# Patient Record
Sex: FEMALE | Race: WHITE | NOT HISPANIC OR LATINO
[De-identification: names, ages, dates, MRNs, and addresses within clinical notes are randomized per-mention and may not be internally consistent; named-entity substitution may affect disease eponyms.]

---

## 2020-01-23 PROBLEM — Z00.00 ENCOUNTER FOR PREVENTIVE HEALTH EXAMINATION: Status: ACTIVE | Noted: 2020-01-23

## 2020-03-10 ENCOUNTER — APPOINTMENT (OUTPATIENT)
Dept: OBGYN | Facility: CLINIC | Age: 30
End: 2020-03-10

## 2021-07-16 ENCOUNTER — APPOINTMENT (OUTPATIENT)
Dept: OTOLARYNGOLOGY | Facility: CLINIC | Age: 31
End: 2021-07-16
Payer: COMMERCIAL

## 2021-07-16 VITALS
BODY MASS INDEX: 31.47 KG/M2 | SYSTOLIC BLOOD PRESSURE: 114 MMHG | WEIGHT: 171 LBS | DIASTOLIC BLOOD PRESSURE: 77 MMHG | HEIGHT: 62 IN | HEART RATE: 71 BPM

## 2021-07-16 DIAGNOSIS — Z3A.16 16 WEEKS GESTATION OF PREGNANCY: ICD-10-CM

## 2021-07-16 DIAGNOSIS — Z78.9 OTHER SPECIFIED HEALTH STATUS: ICD-10-CM

## 2021-07-16 DIAGNOSIS — H92.01 OTALGIA, RIGHT EAR: ICD-10-CM

## 2021-07-16 PROCEDURE — 92567 TYMPANOMETRY: CPT

## 2021-07-16 PROCEDURE — 99213 OFFICE O/P EST LOW 20 MIN: CPT | Mod: 25

## 2021-07-16 PROCEDURE — 92511 NASOPHARYNGOSCOPY: CPT

## 2021-07-16 NOTE — REVIEW OF SYSTEMS
[Ear Pain] : ear pain [Ear Itch] : ear itch [Sinus Pain] : sinus pain [Sinus Pressure] : sinus pressure [Heartburn] : heartburn [Negative] : Heme/Lymph

## 2021-07-16 NOTE — PHYSICAL EXAM
[Hearing Fox Test (Tuning Fork On Forehead)] : no lateralization of tone [Normal] : mucosa is normal [FreeTextEntry6] : minimal cerumen removed via curettage  [FreeTextEntry1] : Cyst on right tonsil. Superior pull.

## 2021-07-16 NOTE — PROCEDURE
[FreeTextEntry1] : Flexible Nasopharyngoscopy  [FreeTextEntry3] : Procedure:  Flexible Nasopharyngoscopy: Risks, benefits, and alternatives of flexible endoscopy were explained to the patient. The patient gave oral consent to proceed. The flexible scope was inserted into the right nasal cavity.  Endoscopy of the inferior and middle meatus was performed.  No polyp, mass, or lesion was appreciated.  Olfactory cleft was clear. Spheno-ethmoid recess is clear. Nasopharynx was clear.  The patient tolerated the procedure well.

## 2021-07-16 NOTE — DATA REVIEWED
[de-identified] : c/o\par \par - type A tymps AU\par - ETF WNL AD; abnormal AS\par \par rec: 1) ENT F/U

## 2021-07-16 NOTE — HISTORY OF PRESENT ILLNESS
[de-identified] : The patient presents with h/o chronic bilateral OE. The patient presents with right ear pain while lying down in the middle of the night., and notes tenderness along her ear to palpation. Her ear pain has been occurring every night.The pt further notes getting ear pain when lying on either side at night. Of note, the patient is currently 16.5 weeks along her pregnancy, and is taking prenatal vitamins. The pt adds having active reflux since pregnancy.

## 2021-07-16 NOTE — ASSESSMENT
[FreeTextEntry1] : Reviewed and reconciled medications, allergies, PMHx, PSHx, SocHx, FMHx.\par \par h/o chronic bilateral OE\par Right tonsil cyst with superior pull\par minimal deviation of the septum\par \par Audio: Type A tymps AU. ETF WNL AD, Abnromal AS. TPP AD -28, AS -29. \par \par \par Plan:\par Flexible Nasopharyngoscopy. Audio - results interpreted by Dr. Pal and reviewed with the patient. Recommended Afrin before bed for 1-2 nights if approved by her PCP/ OBGYN. Consider Flonase if Afrin improves ear pain. FU after delivery.

## 2021-07-16 NOTE — REASON FOR VISIT
[Subsequent Evaluation] : a subsequent evaluation for [FreeTextEntry2] : Patient here to check on ear pain in RT ear

## 2021-07-16 NOTE — ADDENDUM
[FreeTextEntry1] : Documented by Alen Arellano acting as scribe for Dr. Pal on 07/16/2021.\par \par All Medical record entries made by the Scribe were at my, Dr. Pal, direction and personally dictated by me on 07/16/2021 . I have reviewed the chart and agree that the record accurately reflects my personal performance of the history, physical exam, assessment and plan. I have also personally directed, reviewed, and agreed with the discharge instructions.

## 2021-07-26 ENCOUNTER — TRANSCRIPTION ENCOUNTER (OUTPATIENT)
Age: 31
End: 2021-07-26

## 2023-05-08 ENCOUNTER — APPOINTMENT (OUTPATIENT)
Dept: OTOLARYNGOLOGY | Facility: CLINIC | Age: 33
End: 2023-05-08
Payer: COMMERCIAL

## 2023-05-08 VITALS
SYSTOLIC BLOOD PRESSURE: 132 MMHG | DIASTOLIC BLOOD PRESSURE: 83 MMHG | BODY MASS INDEX: 31.28 KG/M2 | HEIGHT: 62 IN | WEIGHT: 170 LBS | HEART RATE: 71 BPM

## 2023-05-08 DIAGNOSIS — J31.1 CHRONIC NASOPHARYNGITIS: ICD-10-CM

## 2023-05-08 DIAGNOSIS — H90.11 CONDUCTIVE HEARING LOSS, UNILATERAL, RIGHT EAR, WITH UNRESTRICTED HEARING ON THE CONTRALATERAL SIDE: ICD-10-CM

## 2023-05-08 PROCEDURE — 92511 NASOPHARYNGOSCOPY: CPT

## 2023-05-08 PROCEDURE — 92557 COMPREHENSIVE HEARING TEST: CPT

## 2023-05-08 PROCEDURE — 99214 OFFICE O/P EST MOD 30 MIN: CPT | Mod: 25

## 2023-05-08 PROCEDURE — 92567 TYMPANOMETRY: CPT

## 2023-05-08 RX ORDER — ONDANSETRON 4 MG/1
4 TABLET, ORALLY DISINTEGRATING ORAL
Qty: 9 | Refills: 0 | Status: DISCONTINUED | COMMUNITY
Start: 2023-03-11

## 2023-05-08 RX ORDER — PNV NO.95/FERROUS FUM/FOLIC AC 28MG-0.8MG
TABLET ORAL
Refills: 0 | Status: DISCONTINUED | COMMUNITY
End: 2023-05-08

## 2023-05-08 RX ORDER — NORETHINDRONE ACETATE AND ETHINYL ESTRADIOL AND FERROUS FUMARATE 1MG-20(24)
1-20 KIT ORAL
Qty: 84 | Refills: 0 | Status: DISCONTINUED | COMMUNITY
Start: 2022-11-08

## 2023-05-08 RX ORDER — HYOSCYAMINE SULFATE 0.12 MG/1
0.12 TABLET ORAL
Qty: 20 | Refills: 0 | Status: DISCONTINUED | COMMUNITY
Start: 2023-03-11

## 2023-05-08 RX ORDER — AMOXICILLIN 875 MG/1
875 TABLET, FILM COATED ORAL
Qty: 14 | Refills: 0 | Status: DISCONTINUED | COMMUNITY
Start: 2023-01-15

## 2023-05-08 RX ORDER — AMOXICILLIN 500 MG/1
500 CAPSULE ORAL
Qty: 14 | Refills: 0 | Status: DISCONTINUED | COMMUNITY
Start: 2023-04-30

## 2023-05-08 RX ORDER — CIPROFLOXACIN AND DEXAMETHASONE 3; 1 MG/ML; MG/ML
0.3-0.1 SUSPENSION/ DROPS AURICULAR (OTIC)
Qty: 7 | Refills: 0 | Status: DISCONTINUED | COMMUNITY
Start: 2023-04-30

## 2023-05-08 NOTE — PHYSICAL EXAM
[Normal] : mucosa is normal [Midline] : trachea located in midline position [de-identified] : scarred AS, AD TM thickend w serous effusion

## 2023-05-08 NOTE — ASSESSMENT
[FreeTextEntry1] :   AD WNL to slight/mild CHL w type B, AS WNL w type B\par \par flonase daily\par discussed medrol dosepak and M&T, will hold off pending response to flonase\par f/u 2-3 weeks

## 2023-05-08 NOTE — DATA REVIEWED
[de-identified] : Type B tymps AU\par AD: normal to slight/mild CHL, 250-8000 Hz\par AS: Hrg is WNL, 250-8000 Hz\par REC: ENT f/u, re-eval per MD

## 2023-05-15 ENCOUNTER — TRANSCRIPTION ENCOUNTER (OUTPATIENT)
Age: 33
End: 2023-05-15

## 2023-06-06 ENCOUNTER — TRANSCRIPTION ENCOUNTER (OUTPATIENT)
Age: 33
End: 2023-06-06

## 2023-06-15 ENCOUNTER — TRANSCRIPTION ENCOUNTER (OUTPATIENT)
Age: 33
End: 2023-06-15

## 2023-07-17 ENCOUNTER — APPOINTMENT (OUTPATIENT)
Dept: OTOLARYNGOLOGY | Facility: CLINIC | Age: 33
End: 2023-07-17
Payer: COMMERCIAL

## 2023-07-17 VITALS
SYSTOLIC BLOOD PRESSURE: 124 MMHG | HEIGHT: 62 IN | HEART RATE: 63 BPM | BODY MASS INDEX: 30.36 KG/M2 | DIASTOLIC BLOOD PRESSURE: 80 MMHG | WEIGHT: 165 LBS

## 2023-07-17 DIAGNOSIS — H69.83 OTHER SPECIFIED DISORDERS OF EUSTACHIAN TUBE, BILATERAL: ICD-10-CM

## 2023-07-17 DIAGNOSIS — H65.21 CHRONIC SEROUS OTITIS MEDIA, RIGHT EAR: ICD-10-CM

## 2023-07-17 PROCEDURE — 99213 OFFICE O/P EST LOW 20 MIN: CPT

## 2023-07-17 PROCEDURE — 92557 COMPREHENSIVE HEARING TEST: CPT

## 2023-07-17 PROCEDURE — 92567 TYMPANOMETRY: CPT

## 2023-07-17 RX ORDER — FLUTICASONE PROPIONATE 50 MCG
SPRAY, SUSPENSION NASAL
Refills: 0 | Status: ACTIVE | COMMUNITY

## 2023-07-17 NOTE — PHYSICAL EXAM
[Normal] : mucosa is normal [Midline] : trachea located in midline position [de-identified] : scarred AU

## 2023-07-17 NOTE — HISTORY OF PRESENT ILLNESS
[de-identified] : 32 yr old female w serous OME AD tx w flonase, never took the medrol dosepak\par feels like her hearing is back to normal\par \par +childhood otitis w perf, s/p BMT x4\par \par flying 7/25/2023

## 2024-04-30 ENCOUNTER — APPOINTMENT (OUTPATIENT)
Dept: OTOLARYNGOLOGY | Facility: CLINIC | Age: 34
End: 2024-04-30
Payer: COMMERCIAL

## 2024-04-30 VITALS
HEIGHT: 62 IN | BODY MASS INDEX: 27.23 KG/M2 | HEART RATE: 80 BPM | WEIGHT: 148 LBS | SYSTOLIC BLOOD PRESSURE: 114 MMHG | DIASTOLIC BLOOD PRESSURE: 77 MMHG

## 2024-04-30 DIAGNOSIS — J31.0 CHRONIC RHINITIS: ICD-10-CM

## 2024-04-30 DIAGNOSIS — J34.2 DEVIATED NASAL SEPTUM: ICD-10-CM

## 2024-04-30 DIAGNOSIS — J35.8 OTHER CHRONIC DISEASES OF TONSILS AND ADENOIDS: ICD-10-CM

## 2024-04-30 DIAGNOSIS — Z78.9 OTHER SPECIFIED HEALTH STATUS: ICD-10-CM

## 2024-04-30 PROCEDURE — 99213 OFFICE O/P EST LOW 20 MIN: CPT

## 2024-04-30 PROCEDURE — 99203 OFFICE O/P NEW LOW 30 MIN: CPT

## 2024-04-30 RX ORDER — LORATADINE 5 MG/5 ML
SOLUTION, ORAL ORAL
Refills: 0 | Status: ACTIVE | COMMUNITY

## 2024-04-30 NOTE — PHYSICAL EXAM
[] : septum deviated to the left [de-identified] : ATROPHIC TURBINATES MOSTLY IN THE RIGHT [Normal] : mucosa is normal [Midline] : trachea located in midline position [de-identified] : RIGHT TONSIL SMALL CYST

## 2024-04-30 NOTE — REASON FOR VISIT
[Subsequent Evaluation] : a subsequent evaluation for [FreeTextEntry2] : Ear wax removal, allergy medication

## 2024-06-17 ENCOUNTER — APPOINTMENT (OUTPATIENT)
Dept: OTOLARYNGOLOGY | Facility: CLINIC | Age: 34
End: 2024-06-17

## 2024-06-20 ENCOUNTER — APPOINTMENT (OUTPATIENT)
Dept: OTOLARYNGOLOGY | Facility: CLINIC | Age: 34
End: 2024-06-20